# Patient Record
Sex: FEMALE | Race: WHITE | NOT HISPANIC OR LATINO
[De-identification: names, ages, dates, MRNs, and addresses within clinical notes are randomized per-mention and may not be internally consistent; named-entity substitution may affect disease eponyms.]

---

## 2021-11-09 ENCOUNTER — APPOINTMENT (OUTPATIENT)
Dept: OPHTHALMOLOGY | Facility: CLINIC | Age: 86
End: 2021-11-09
Payer: MEDICARE

## 2021-11-09 ENCOUNTER — NON-APPOINTMENT (OUTPATIENT)
Age: 86
End: 2021-11-09

## 2021-11-09 PROCEDURE — 92014 COMPRE OPH EXAM EST PT 1/>: CPT

## 2022-09-26 ENCOUNTER — NON-APPOINTMENT (OUTPATIENT)
Age: 87
End: 2022-09-26

## 2022-09-26 ENCOUNTER — APPOINTMENT (OUTPATIENT)
Dept: OPHTHALMOLOGY | Facility: CLINIC | Age: 87
End: 2022-09-26

## 2022-09-26 PROCEDURE — 99213 OFFICE O/P EST LOW 20 MIN: CPT

## 2022-10-11 ENCOUNTER — NON-APPOINTMENT (OUTPATIENT)
Age: 87
End: 2022-10-11

## 2022-10-11 ENCOUNTER — APPOINTMENT (OUTPATIENT)
Dept: OPHTHALMOLOGY | Facility: CLINIC | Age: 87
End: 2022-10-11

## 2022-10-11 PROCEDURE — 99213 OFFICE O/P EST LOW 20 MIN: CPT

## 2022-11-15 ENCOUNTER — APPOINTMENT (OUTPATIENT)
Dept: OPHTHALMOLOGY | Facility: CLINIC | Age: 87
End: 2022-11-15

## 2022-11-15 ENCOUNTER — NON-APPOINTMENT (OUTPATIENT)
Age: 87
End: 2022-11-15

## 2022-11-15 PROCEDURE — 92014 COMPRE OPH EXAM EST PT 1/>: CPT

## 2022-11-15 PROCEDURE — 92133 CPTRZD OPH DX IMG PST SGM ON: CPT

## 2022-11-19 ENCOUNTER — NON-APPOINTMENT (OUTPATIENT)
Age: 87
End: 2022-11-19

## 2022-11-28 ENCOUNTER — APPOINTMENT (OUTPATIENT)
Dept: ORTHOPEDIC SURGERY | Facility: CLINIC | Age: 87
End: 2022-11-28

## 2022-12-05 ENCOUNTER — APPOINTMENT (OUTPATIENT)
Dept: OPHTHALMOLOGY | Facility: CLINIC | Age: 87
End: 2022-12-05

## 2022-12-05 ENCOUNTER — NON-APPOINTMENT (OUTPATIENT)
Age: 87
End: 2022-12-05

## 2022-12-05 PROCEDURE — 66821 AFTER CATARACT LASER SURGERY: CPT | Mod: RT

## 2023-01-03 ENCOUNTER — APPOINTMENT (OUTPATIENT)
Dept: OPHTHALMOLOGY | Facility: CLINIC | Age: 88
End: 2023-01-03
Payer: MEDICARE

## 2023-01-03 ENCOUNTER — NON-APPOINTMENT (OUTPATIENT)
Age: 88
End: 2023-01-03

## 2023-01-03 PROCEDURE — 99024 POSTOP FOLLOW-UP VISIT: CPT

## 2023-03-13 ENCOUNTER — APPOINTMENT (OUTPATIENT)
Dept: ORTHOPEDIC SURGERY | Facility: CLINIC | Age: 88
End: 2023-03-13
Payer: MEDICARE

## 2023-03-13 VITALS — WEIGHT: 190 LBS | HEIGHT: 64 IN | BODY MASS INDEX: 32.44 KG/M2

## 2023-03-13 DIAGNOSIS — Z78.9 OTHER SPECIFIED HEALTH STATUS: ICD-10-CM

## 2023-03-13 DIAGNOSIS — I10 ESSENTIAL (PRIMARY) HYPERTENSION: ICD-10-CM

## 2023-03-13 PROCEDURE — 99203 OFFICE O/P NEW LOW 30 MIN: CPT

## 2023-03-13 RX ORDER — DULOXETINE HYDROCHLORIDE 30 MG/1
30 CAPSULE, DELAYED RELEASE PELLETS ORAL
Qty: 30 | Refills: 0 | Status: ACTIVE | COMMUNITY
Start: 2022-12-21

## 2023-03-13 RX ORDER — PREGABALIN 25 MG/1
25 CAPSULE ORAL
Qty: 120 | Refills: 0 | Status: ACTIVE | COMMUNITY
Start: 2023-01-27

## 2023-03-13 RX ORDER — PREDNISOLONE ACETATE 10 MG/ML
1 SUSPENSION/ DROPS OPHTHALMIC
Qty: 5 | Refills: 0 | Status: ACTIVE | COMMUNITY
Start: 2022-12-05

## 2023-03-13 RX ORDER — GABAPENTIN 100 MG/1
100 CAPSULE ORAL
Qty: 270 | Refills: 0 | Status: ACTIVE | COMMUNITY
Start: 2022-11-14

## 2023-03-13 RX ORDER — PREDNISONE 10 MG/1
10 TABLET ORAL
Qty: 60 | Refills: 0 | Status: ACTIVE | COMMUNITY
Start: 2022-12-14

## 2023-03-13 RX ORDER — MELOXICAM 15 MG/1
15 TABLET ORAL
Qty: 90 | Refills: 0 | Status: ACTIVE | COMMUNITY
Start: 2022-10-17

## 2023-03-13 RX ORDER — ALPRAZOLAM 1 MG/1
1 TABLET ORAL
Qty: 10 | Refills: 0 | Status: ACTIVE | COMMUNITY
Start: 2022-11-03

## 2023-03-13 RX ORDER — NEOMYCIN AND POLYMYXIN B SULFATES AND DEXAMETHASONE 3.5; 10000; 1 MG/G; [IU]/G; MG/G
3.5-10000-0.1 OINTMENT OPHTHALMIC
Qty: 4 | Refills: 0 | Status: ACTIVE | COMMUNITY
Start: 2022-09-26

## 2023-03-13 RX ORDER — IRBESARTAN AND HYDROCHLOROTHIAZIDE 150; 12.5 MG/1; MG/1
150-12.5 TABLET ORAL
Qty: 90 | Refills: 0 | Status: ACTIVE | COMMUNITY
Start: 2023-01-30

## 2023-03-13 RX ORDER — PREGABALIN 50 MG/1
50 CAPSULE ORAL
Qty: 60 | Refills: 0 | Status: ACTIVE | COMMUNITY
Start: 2023-03-09

## 2023-03-13 RX ORDER — OXYBUTYNIN CHLORIDE 5 MG/1
5 TABLET ORAL
Qty: 90 | Refills: 0 | Status: ACTIVE | COMMUNITY
Start: 2022-07-14

## 2023-03-13 RX ORDER — DICLOFENAC SODIUM 1% 10 MG/G
1 GEL TOPICAL
Qty: 200 | Refills: 0 | Status: ACTIVE | COMMUNITY
Start: 2023-03-09

## 2023-03-20 NOTE — HISTORY OF PRESENT ILLNESS
[de-identified] : 87F, RHD, PMHX of GERD, HTN presents with right shoulder pain since November 2022. Patient reports came on suddenly, denies injury/trauma. Admits to having x-rays done at . Reports pain radiates from shoulder down into the hand. Denies numbness/tingling. Has tried OTC remedies w/o relief. Denies OT/PT.

## 2023-03-20 NOTE — ASSESSMENT
[FreeTextEntry1] : Right shoulder subacromial bursitis vs supra tendinosis - reviewed radiographs and pathoanatomy with patient. Discussed management to focus on NSAIDs prn, PT, activity modification.\par \par F/u 2-3mo

## 2023-03-20 NOTE — IMAGING
[de-identified] : RIGHT SHOULDER EXAM\par +TTP AT ANTEROLATERAL SHOULDER.\par Skin intact\par No muscle atrophy noted\par Shoulder ROM\par   Forward flexion to 160°; contralateral shoulder 160°\par   Abduction to 120° (painful)\par   ER with elbow at side to 45°; contralateral shoulder 45°\par   IR to L1\par \par + Neo empty can test\par + Hawkin’s impingement test\par - Speed's test\par - Apprehension test\par - Lift-off test\par - Cross-body adduction test\par \par Rotator cuff strength is 3+/5 throughout\par Hand is warm and well perfused with brisk capillary refill throughout\par Motor function intact to axillary, AIN, PIN, and ulnar nerves\par Sensation intact axillary, median, ulnar, and superficial radial nerves\par Elbow and wrist motion intact and full\par Patient able to make a composite fist\par \par

## 2023-03-23 ENCOUNTER — FORM ENCOUNTER (OUTPATIENT)
Age: 88
End: 2023-03-23

## 2023-03-29 ENCOUNTER — APPOINTMENT (OUTPATIENT)
Dept: ORTHOPEDIC SURGERY | Facility: CLINIC | Age: 88
End: 2023-03-29
Payer: MEDICARE

## 2023-03-29 DIAGNOSIS — M75.50 BURSITIS OF UNSPECIFIED SHOULDER: ICD-10-CM

## 2023-03-29 DIAGNOSIS — M75.101 UNSPECIFIED ROTATOR CUFF TEAR OR RUPTURE OF RIGHT SHOULDER, NOT SPECIFIED AS TRAUMATIC: ICD-10-CM

## 2023-03-29 PROCEDURE — 20610 DRAIN/INJ JOINT/BURSA W/O US: CPT | Mod: RT

## 2023-03-29 PROCEDURE — 99214 OFFICE O/P EST MOD 30 MIN: CPT | Mod: 25

## 2023-03-29 NOTE — IMAGING
[de-identified] : RIGHT SHOULDER EXAM\par +TTP AT ANTEROLATERAL SHOULDER.\par Skin intact\par No muscle atrophy noted\par Shoulder ROM\par   Forward flexion to 160°; contralateral shoulder 160°\par   Abduction to 120° (painful)\par   ER with elbow at side to 45°; contralateral shoulder 45°\par   IR to L1\par \par + Neo empty can test\par + Hawkin’s impingement test\par + Speed's test\par - Apprehension test\par - Lift-off test\par - Cross-body adduction test\par \par Rotator cuff strength is 3+/5 throughout\par Hand is warm and well perfused with brisk capillary refill throughout\par Motor function intact to axillary, AIN, PIN, and ulnar nerves\par Sensation intact axillary, median, ulnar, and superficial radial nerves\par Elbow and wrist motion intact and full\par Patient able to make a composite fist\par \par Right shoulder MRI with supra tear, labral tears, subacromial bursitis. \par

## 2023-03-29 NOTE — ASSESSMENT
[FreeTextEntry1] : Right shoulder subacromial bursitis vs supra tendinosis - reviewed MRI, radiographs and pathoanatomy with patient. Discussed management to focus on NSAIDs prn, PT, activity modification. After discussion of risks/benefits, patient elected to proceed with CSI to right shoulder.\par \par Procedure 1 - 5.0cc 1%lidocaine + 1.0cc 40mg/cc Kenalog administered to right proximal biceps and glenohumeral joint following sterilization with Betadine. Patient tolerated this well.\par \par F/u 2-3mo\par \par

## 2023-03-29 NOTE — HISTORY OF PRESENT ILLNESS
[de-identified] : 87F, RHD, PMHX of GERD, HTN presents with right shoulder pain since November 2022. Patient reports came on suddenly, denies injury/trauma. Admits to having x-rays done at . Reports pain radiates from shoulder down into the hand. Denies numbness/tingling. Has tried OTC remedies w/o relief. Denies OT/PT. \par \par 3/29/23: f/u right shoulder. Still having pain. Did have MRI done and here for results. Had MRI done at [facility]

## 2023-06-06 ENCOUNTER — APPOINTMENT (OUTPATIENT)
Dept: OPHTHALMOLOGY | Facility: CLINIC | Age: 88
End: 2023-06-06
Payer: MEDICARE

## 2023-06-06 ENCOUNTER — NON-APPOINTMENT (OUTPATIENT)
Age: 88
End: 2023-06-06

## 2023-06-06 PROCEDURE — 99213 OFFICE O/P EST LOW 20 MIN: CPT

## 2023-07-11 ENCOUNTER — APPOINTMENT (OUTPATIENT)
Dept: OPHTHALMOLOGY | Facility: CLINIC | Age: 88
End: 2023-07-11

## 2024-04-23 ENCOUNTER — APPOINTMENT (OUTPATIENT)
Dept: OPHTHALMOLOGY | Facility: CLINIC | Age: 89
End: 2024-04-23
Payer: MEDICARE

## 2024-04-23 ENCOUNTER — NON-APPOINTMENT (OUTPATIENT)
Age: 89
End: 2024-04-23

## 2024-04-23 PROCEDURE — 92014 COMPRE OPH EXAM EST PT 1/>: CPT

## 2024-04-23 PROCEDURE — 92133 CPTRZD OPH DX IMG PST SGM ON: CPT

## 2024-04-30 ENCOUNTER — APPOINTMENT (OUTPATIENT)
Dept: ORTHOPEDIC SURGERY | Facility: CLINIC | Age: 89
End: 2024-04-30
Payer: MEDICARE

## 2024-04-30 DIAGNOSIS — S83.411A SPRAIN OF MEDIAL COLLATERAL LIGAMENT OF RIGHT KNEE, INITIAL ENCOUNTER: ICD-10-CM

## 2024-04-30 PROCEDURE — 73562 X-RAY EXAM OF KNEE 3: CPT | Mod: RT

## 2024-04-30 PROCEDURE — 99213 OFFICE O/P EST LOW 20 MIN: CPT

## 2024-04-30 NOTE — DISCUSSION/SUMMARY
[de-identified] : I reviewed patient's radiographs and discussed her condition and treatment options.  I advised wearing soft knee sleeve and applying biofreeze to the area.  Follow up as needed.  Patient voiced understanding and agreement with the plan.

## 2024-04-30 NOTE — PHYSICAL EXAM
[5___] : hamstring 5[unfilled]/5 [] : mildly antalgic [Right] : right knee [AP] : anteroposterior [Lateral] : lateral [[place]] : skyline [There are no fractures, subluxations or dislocations. No significant abnormalities are seen] : There are no fractures, subluxations or dislocations. No significant abnormalities are seen

## 2024-04-30 NOTE — HISTORY OF PRESENT ILLNESS
[de-identified] :  Patient presents for evaluation on RT knee pain. Patient presents walking with cane to help ambulate (was using one when she had back pain). Patient denies injury but reports medial sided knee pain for the past week with walking. Patient thinks she might have twisted the knee at some point. Patient states she is taking Meloxicam for her back but seems to be also helping with her knee.